# Patient Record
Sex: MALE
[De-identification: names, ages, dates, MRNs, and addresses within clinical notes are randomized per-mention and may not be internally consistent; named-entity substitution may affect disease eponyms.]

---

## 2017-01-13 ENCOUNTER — TRANSCRIPTION ENCOUNTER (OUTPATIENT)
Age: 47
End: 2017-01-13

## 2017-02-06 ENCOUNTER — OTHER (OUTPATIENT)
Age: 47
End: 2017-02-06

## 2017-05-15 ENCOUNTER — OTHER (OUTPATIENT)
Age: 47
End: 2017-05-15

## 2017-06-22 ENCOUNTER — OUTPATIENT (OUTPATIENT)
Dept: OUTPATIENT SERVICES | Facility: HOSPITAL | Age: 47
LOS: 1 days | Discharge: HOME | End: 2017-06-22

## 2017-06-22 DIAGNOSIS — D51.0 VITAMIN B12 DEFICIENCY ANEMIA DUE TO INTRINSIC FACTOR DEFICIENCY: ICD-10-CM

## 2017-06-27 ENCOUNTER — APPOINTMENT (OUTPATIENT)
Dept: HEMATOLOGY ONCOLOGY | Facility: CLINIC | Age: 47
End: 2017-06-27

## 2017-06-27 ENCOUNTER — RESULT REVIEW (OUTPATIENT)
Age: 47
End: 2017-06-27

## 2017-06-27 ENCOUNTER — OUTPATIENT (OUTPATIENT)
Dept: OUTPATIENT SERVICES | Facility: HOSPITAL | Age: 47
LOS: 1 days | End: 2017-06-27
Payer: COMMERCIAL

## 2017-06-27 ENCOUNTER — APPOINTMENT (OUTPATIENT)
Dept: MRI IMAGING | Facility: IMAGING CENTER | Age: 47
End: 2017-06-27

## 2017-06-27 VITALS
TEMPERATURE: 98 F | SYSTOLIC BLOOD PRESSURE: 125 MMHG | OXYGEN SATURATION: 100 % | HEART RATE: 58 BPM | HEIGHT: 70.08 IN | BODY MASS INDEX: 28.63 KG/M2 | DIASTOLIC BLOOD PRESSURE: 82 MMHG | WEIGHT: 199.96 LBS | RESPIRATION RATE: 16 BRPM

## 2017-06-27 DIAGNOSIS — C49.A9 GASTROINTESTINAL STROMAL TUMOR OF OTHER SITES: ICD-10-CM

## 2017-06-27 LAB
HCT VFR BLD CALC: 39.1 % — SIGNIFICANT CHANGE UP (ref 39–50)
HGB BLD-MCNC: 13.4 G/DL — SIGNIFICANT CHANGE UP (ref 13–17)
MCHC RBC-ENTMCNC: 34.3 G/DL — SIGNIFICANT CHANGE UP (ref 32–36)
MCHC RBC-ENTMCNC: 34.6 PG — HIGH (ref 27–34)
MCV RBC AUTO: 101 FL — HIGH (ref 80–100)
PLATELET # BLD AUTO: 230 K/UL — SIGNIFICANT CHANGE UP (ref 150–400)
RBC # BLD: 3.88 M/UL — LOW (ref 4.2–5.8)
RBC # FLD: 11.9 % — SIGNIFICANT CHANGE UP (ref 10.3–14.5)
WBC # BLD: 4.7 K/UL — SIGNIFICANT CHANGE UP (ref 3.8–10.5)
WBC # FLD AUTO: 4.7 K/UL — SIGNIFICANT CHANGE UP (ref 3.8–10.5)

## 2017-06-27 PROCEDURE — A9585: CPT

## 2017-06-27 PROCEDURE — 72197 MRI PELVIS W/O & W/DYE: CPT

## 2017-06-27 PROCEDURE — 74183 MRI ABD W/O CNTR FLWD CNTR: CPT

## 2017-06-28 LAB
25(OH)D3 SERPL-MCNC: 38.3 NG/ML
ALBUMIN SERPL ELPH-MCNC: 4.4 G/DL
ALP BLD-CCNC: 46 U/L
ALT SERPL-CCNC: 16 U/L
ANION GAP SERPL CALC-SCNC: 14 MMOL/L
AST SERPL-CCNC: 25 U/L
BILIRUB SERPL-MCNC: 0.4 MG/DL
BUN SERPL-MCNC: 12 MG/DL
CALCIUM SERPL-MCNC: 8.9 MG/DL
CHLORIDE SERPL-SCNC: 102 MMOL/L
CO2 SERPL-SCNC: 25 MMOL/L
CREAT SERPL-MCNC: 1.01 MG/DL
GLUCOSE SERPL-MCNC: 101 MG/DL
POTASSIUM SERPL-SCNC: 4.4 MMOL/L
PROT SERPL-MCNC: 7.5 G/DL
SODIUM SERPL-SCNC: 141 MMOL/L
TSH SERPL-ACNC: 1.97 UIU/ML

## 2018-01-19 ENCOUNTER — MEDICATION RENEWAL (OUTPATIENT)
Age: 48
End: 2018-01-19

## 2018-02-12 ENCOUNTER — FORM ENCOUNTER (OUTPATIENT)
Age: 48
End: 2018-02-12

## 2018-02-12 ENCOUNTER — OUTPATIENT (OUTPATIENT)
Dept: OUTPATIENT SERVICES | Facility: HOSPITAL | Age: 48
LOS: 1 days | Discharge: HOME | End: 2018-02-12

## 2018-02-12 DIAGNOSIS — D51.0 VITAMIN B12 DEFICIENCY ANEMIA DUE TO INTRINSIC FACTOR DEFICIENCY: ICD-10-CM

## 2018-02-13 ENCOUNTER — APPOINTMENT (OUTPATIENT)
Dept: HEMATOLOGY ONCOLOGY | Facility: CLINIC | Age: 48
End: 2018-02-13
Payer: COMMERCIAL

## 2018-02-13 ENCOUNTER — APPOINTMENT (OUTPATIENT)
Dept: MRI IMAGING | Facility: CLINIC | Age: 48
End: 2018-02-13
Payer: COMMERCIAL

## 2018-02-13 ENCOUNTER — LABORATORY RESULT (OUTPATIENT)
Age: 48
End: 2018-02-13

## 2018-02-13 ENCOUNTER — OUTPATIENT (OUTPATIENT)
Dept: OUTPATIENT SERVICES | Facility: HOSPITAL | Age: 48
LOS: 1 days | End: 2018-02-13
Payer: COMMERCIAL

## 2018-02-13 ENCOUNTER — RESULT REVIEW (OUTPATIENT)
Age: 48
End: 2018-02-13

## 2018-02-13 VITALS
OXYGEN SATURATION: 100 % | BODY MASS INDEX: 30.02 KG/M2 | TEMPERATURE: 99.3 F | HEART RATE: 75 BPM | DIASTOLIC BLOOD PRESSURE: 85 MMHG | RESPIRATION RATE: 16 BRPM | WEIGHT: 209.66 LBS | SYSTOLIC BLOOD PRESSURE: 137 MMHG

## 2018-02-13 DIAGNOSIS — Z00.8 ENCOUNTER FOR OTHER GENERAL EXAMINATION: ICD-10-CM

## 2018-02-13 DIAGNOSIS — C49.A9 GASTROINTESTINAL STROMAL TUMOR OF OTHER SITES: ICD-10-CM

## 2018-02-13 LAB
ALBUMIN SERPL ELPH-MCNC: 4 G/DL
ALP BLD-CCNC: 46 U/L
ALT SERPL-CCNC: 13 U/L
ANION GAP SERPL CALC-SCNC: 11 MMOL/L
AST SERPL-CCNC: 19 U/L
BILIRUB SERPL-MCNC: 0.4 MG/DL
BUN SERPL-MCNC: 13 MG/DL
CALCIUM SERPL-MCNC: 8.7 MG/DL
CHLORIDE SERPL-SCNC: 104 MMOL/L
CO2 SERPL-SCNC: 26 MMOL/L
CREAT SERPL-MCNC: 1.16 MG/DL
GLUCOSE SERPL-MCNC: 95 MG/DL
HCT VFR BLD CALC: 38.4 % — LOW (ref 39–50)
HGB BLD-MCNC: 13 G/DL — SIGNIFICANT CHANGE UP (ref 13–17)
MCHC RBC-ENTMCNC: 33.8 G/DL — SIGNIFICANT CHANGE UP (ref 32–36)
MCHC RBC-ENTMCNC: 34 PG — SIGNIFICANT CHANGE UP (ref 27–34)
MCV RBC AUTO: 101 FL — HIGH (ref 80–100)
PLATELET # BLD AUTO: 211 K/UL — SIGNIFICANT CHANGE UP (ref 150–400)
POTASSIUM SERPL-SCNC: 3.9 MMOL/L
PROT SERPL-MCNC: 6.4 G/DL
RBC # BLD: 3.82 M/UL — LOW (ref 4.2–5.8)
RBC # FLD: 11.9 % — SIGNIFICANT CHANGE UP (ref 10.3–14.5)
SODIUM SERPL-SCNC: 141 MMOL/L
TSH SERPL-ACNC: 2.87 UIU/ML
WBC # BLD: 4.2 K/UL — SIGNIFICANT CHANGE UP (ref 3.8–10.5)
WBC # FLD AUTO: 4.2 K/UL — SIGNIFICANT CHANGE UP (ref 3.8–10.5)

## 2018-02-13 PROCEDURE — A9585: CPT

## 2018-02-13 PROCEDURE — 99214 OFFICE O/P EST MOD 30 MIN: CPT

## 2018-02-13 PROCEDURE — 74183 MRI ABD W/O CNTR FLWD CNTR: CPT

## 2018-02-13 PROCEDURE — 74183 MRI ABD W/O CNTR FLWD CNTR: CPT | Mod: 26

## 2018-02-13 PROCEDURE — 72197 MRI PELVIS W/O & W/DYE: CPT

## 2018-02-13 PROCEDURE — 72197 MRI PELVIS W/O & W/DYE: CPT | Mod: 26

## 2018-03-29 ENCOUNTER — MEDICATION RENEWAL (OUTPATIENT)
Age: 48
End: 2018-03-29

## 2018-04-12 ENCOUNTER — MEDICATION RENEWAL (OUTPATIENT)
Age: 48
End: 2018-04-12

## 2018-04-16 ENCOUNTER — OTHER (OUTPATIENT)
Age: 48
End: 2018-04-16

## 2018-08-16 ENCOUNTER — APPOINTMENT (OUTPATIENT)
Dept: HEMATOLOGY ONCOLOGY | Facility: CLINIC | Age: 48
End: 2018-08-16

## 2018-09-20 ENCOUNTER — OUTPATIENT (OUTPATIENT)
Dept: OUTPATIENT SERVICES | Facility: HOSPITAL | Age: 48
LOS: 1 days | Discharge: HOME | End: 2018-09-20

## 2018-09-20 DIAGNOSIS — D51.0 VITAMIN B12 DEFICIENCY ANEMIA DUE TO INTRINSIC FACTOR DEFICIENCY: ICD-10-CM

## 2018-09-27 ENCOUNTER — RESULT REVIEW (OUTPATIENT)
Age: 48
End: 2018-09-27

## 2018-09-27 ENCOUNTER — APPOINTMENT (OUTPATIENT)
Dept: HEMATOLOGY ONCOLOGY | Facility: CLINIC | Age: 48
End: 2018-09-27
Payer: COMMERCIAL

## 2018-09-27 ENCOUNTER — OUTPATIENT (OUTPATIENT)
Dept: OUTPATIENT SERVICES | Facility: HOSPITAL | Age: 48
LOS: 1 days | End: 2018-09-27
Payer: COMMERCIAL

## 2018-09-27 ENCOUNTER — APPOINTMENT (OUTPATIENT)
Dept: MRI IMAGING | Facility: CLINIC | Age: 48
End: 2018-09-27
Payer: COMMERCIAL

## 2018-09-27 VITALS
DIASTOLIC BLOOD PRESSURE: 80 MMHG | TEMPERATURE: 98.1 F | WEIGHT: 207.23 LBS | BODY MASS INDEX: 29.67 KG/M2 | HEART RATE: 70 BPM | OXYGEN SATURATION: 100 % | RESPIRATION RATE: 16 BRPM | SYSTOLIC BLOOD PRESSURE: 130 MMHG

## 2018-09-27 DIAGNOSIS — C49.A9 GASTROINTESTINAL STROMAL TUMOR OF OTHER SITES: ICD-10-CM

## 2018-09-27 DIAGNOSIS — R79.89 OTHER SPECIFIED ABNORMAL FINDINGS OF BLOOD CHEMISTRY: ICD-10-CM

## 2018-09-27 LAB
BASOPHILS # BLD AUTO: 0 K/UL — SIGNIFICANT CHANGE UP (ref 0–0.2)
BASOPHILS NFR BLD AUTO: 0.9 % — SIGNIFICANT CHANGE UP (ref 0–2)
EOSINOPHIL # BLD AUTO: 0.1 K/UL — SIGNIFICANT CHANGE UP (ref 0–0.5)
EOSINOPHIL NFR BLD AUTO: 2.9 % — SIGNIFICANT CHANGE UP (ref 0–6)
HCT VFR BLD CALC: 38.5 % — LOW (ref 39–50)
HGB BLD-MCNC: 13.1 G/DL — SIGNIFICANT CHANGE UP (ref 13–17)
LYMPHOCYTES # BLD AUTO: 1.3 K/UL — SIGNIFICANT CHANGE UP (ref 1–3.3)
LYMPHOCYTES # BLD AUTO: 30.5 % — SIGNIFICANT CHANGE UP (ref 13–44)
MCHC RBC-ENTMCNC: 33.4 PG — SIGNIFICANT CHANGE UP (ref 27–34)
MCHC RBC-ENTMCNC: 34 G/DL — SIGNIFICANT CHANGE UP (ref 32–36)
MCV RBC AUTO: 98.4 FL — SIGNIFICANT CHANGE UP (ref 80–100)
MONOCYTES # BLD AUTO: 0.5 K/UL — SIGNIFICANT CHANGE UP (ref 0–0.9)
MONOCYTES NFR BLD AUTO: 12.2 % — SIGNIFICANT CHANGE UP (ref 2–14)
NEUTROPHILS # BLD AUTO: 2.3 K/UL — SIGNIFICANT CHANGE UP (ref 1.8–7.4)
NEUTROPHILS NFR BLD AUTO: 53.5 % — SIGNIFICANT CHANGE UP (ref 43–77)
PLATELET # BLD AUTO: 227 K/UL — SIGNIFICANT CHANGE UP (ref 150–400)
RBC # BLD: 3.91 M/UL — LOW (ref 4.2–5.8)
RBC # FLD: 12 % — SIGNIFICANT CHANGE UP (ref 10.3–14.5)
WBC # BLD: 4.3 K/UL — SIGNIFICANT CHANGE UP (ref 3.8–10.5)
WBC # FLD AUTO: 4.3 K/UL — SIGNIFICANT CHANGE UP (ref 3.8–10.5)

## 2018-09-27 PROCEDURE — 74183 MRI ABD W/O CNTR FLWD CNTR: CPT | Mod: 26

## 2018-09-27 PROCEDURE — 72197 MRI PELVIS W/O & W/DYE: CPT | Mod: 26

## 2018-09-27 PROCEDURE — A9585: CPT

## 2018-09-27 PROCEDURE — 74183 MRI ABD W/O CNTR FLWD CNTR: CPT

## 2018-09-27 PROCEDURE — 72197 MRI PELVIS W/O & W/DYE: CPT

## 2018-09-27 PROCEDURE — 99213 OFFICE O/P EST LOW 20 MIN: CPT

## 2018-09-28 LAB
ALBUMIN SERPL ELPH-MCNC: 4.3 G/DL
ALP BLD-CCNC: 55 U/L
ALT SERPL-CCNC: 19 U/L
ANION GAP SERPL CALC-SCNC: 13 MMOL/L
AST SERPL-CCNC: 29 U/L
BILIRUB SERPL-MCNC: 0.4 MG/DL
BUN SERPL-MCNC: 14 MG/DL
CALCIUM SERPL-MCNC: 8.9 MG/DL
CHLORIDE SERPL-SCNC: 101 MMOL/L
CO2 SERPL-SCNC: 27 MMOL/L
CREAT SERPL-MCNC: 1.34 MG/DL
GLUCOSE SERPL-MCNC: 93 MG/DL
POTASSIUM SERPL-SCNC: 4.3 MMOL/L
PROT SERPL-MCNC: 6.8 G/DL
SODIUM SERPL-SCNC: 141 MMOL/L
TSH SERPL-ACNC: 1.54 UIU/ML

## 2018-10-02 PROBLEM — R79.89 ELEVATED SERUM CREATININE: Status: ACTIVE | Noted: 2018-10-02

## 2018-10-10 ENCOUNTER — TRANSCRIPTION ENCOUNTER (OUTPATIENT)
Age: 48
End: 2018-10-10

## 2018-10-25 ENCOUNTER — MEDICATION RENEWAL (OUTPATIENT)
Age: 48
End: 2018-10-25

## 2019-04-12 ENCOUNTER — OUTPATIENT (OUTPATIENT)
Dept: OUTPATIENT SERVICES | Facility: HOSPITAL | Age: 49
LOS: 1 days | Discharge: HOME | End: 2019-04-12

## 2019-04-12 DIAGNOSIS — D51.0 VITAMIN B12 DEFICIENCY ANEMIA DUE TO INTRINSIC FACTOR DEFICIENCY: ICD-10-CM

## 2019-04-18 ENCOUNTER — APPOINTMENT (OUTPATIENT)
Dept: HEMATOLOGY ONCOLOGY | Facility: CLINIC | Age: 49
End: 2019-04-18
Payer: COMMERCIAL

## 2019-04-18 VITALS
DIASTOLIC BLOOD PRESSURE: 62 MMHG | HEART RATE: 69 BPM | OXYGEN SATURATION: 99 % | WEIGHT: 202.83 LBS | BODY MASS INDEX: 29.04 KG/M2 | RESPIRATION RATE: 16 BRPM | TEMPERATURE: 98 F | SYSTOLIC BLOOD PRESSURE: 107 MMHG

## 2019-04-18 PROCEDURE — 99213 OFFICE O/P EST LOW 20 MIN: CPT

## 2019-04-18 NOTE — RESULTS/DATA
[FreeTextEntry1] : MRI Abd Pelvis - 09/28/2018: My review, I saw these scans myself - disease no worse in liver. No extrahepatic disease either. Final review PENDING by a real radiologist\par \par MRI abd/pelvis : 4/12/19 : report reviewed, no change.

## 2019-04-18 NOTE — END OF VISIT
[FreeTextEntry3] : KARI Morataya, not a fellow, is with whom I am attestisizing. In fact, I agree with the Hx, interval Hx, ROS, medication list, allergies, exam, lab review and plan as outlined. [] : Fellow

## 2019-04-18 NOTE — CONSULT LETTER
[Dear  ___] : Dear  [unfilled], [Please see my note below.] : Please see my note below. [Consult Closing:] : Thank you very much for allowing me to participate in the care of this patient.  If you have any questions, please do not hesitate to contact me. [Sincerely,] : Sincerely, [DrMilana  ___] : Dr. QUINONEZ [FreeTextEntry2] : Freddy Mena III, MD     [address]    [address] [address]    [phone] [FreeTextEntry3] : Aníbal Lang MD PhD FACP\par \par [facility]\par [facility] [FreeTextEntry1] : Adán is doing very well and hopefully that will continue to be the case. We will see him again in 4-6 months with new imaging.

## 2019-04-18 NOTE — REVIEW OF SYSTEMS
[Patient Intake Form Reviewed] : Patient intake form was reviewed [Negative] : Heme/Lymph [FreeTextEntry3] : except minimal periorbital edema

## 2019-04-18 NOTE — ASSESSMENT
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 If worse at primary site he could under surgery or RT. In the meantime he has an excellent result from imatinib for his liver metastatic disease. \par \par Suggest\par \par MRI : Doing well, no new imaging planned for at least 6 mo if not more. \par Continue imatinib 400 daily except for any intercurrent illnesses. 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 \par 2nd line Rx trials or sunitinib \par \par Pt seen with Dr. Aníbal Lang MD PhD\par \par Mounika Morataya, ANP-BC\par

## 2019-04-18 NOTE — HISTORY OF PRESENT ILLNESS
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 He has a sustained partial response to imatinib. He is seen for advice, opinion on further management. KIT mutation positive in exon 11\par \par He was in his USOH until:\par \par 06/24/2012: Presented to [facility] ED with lightheadedness, tachycardia, SOB with 3 episodes of syncope, 2 after emesis, 1 after urination. He had fever to 100.7 x 3 days as well. He had been taking ibuprofen for 2 weeks for chronic neck pain. FOBT was positive, and he was admitted. H/H = 7 and 22. \par \par 06/26/2012: EGD/EUS showed 4 cm mass at 4th layer of wall of GEJ. Three lesions were noted in the liver 2-3 cm in size as well. Bx showed GIST. CT showed 6.9 cm lesion in right lobe of liver, 4.4 cm lesion in caudate, 5.1 cm  lesion in LL liver, and 4 cm ulcerated lobulated gastric mass at the fundus/cardia.\par \par 07/12/2012: Started imatinib 400 mg oral daily with occasional issues with bone pain, jonn. in left leg. \par \par 09/2012: Doing well in follow up with evidence of VA already. \par \par 2013: Some issues with psoriatic rash and acneiform rash after sun exposure. \par \par [id number]: Treatment with imatinib tolerated well without dose reduction. 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 \par \par 6/27/17: Here for a follow up. KIT mutation was positive at exon 11. MRI today, final report pending however appears stable overall. Continues on imatinib. Periorbital edema is minimal.\par \par 2/13/18: Follow up on imatinib. MRI this AM prior to the visit. Energy is good, continues on imatinib 400 mg daily for the last 5 years. Just returned from [place] from vacation.\par \par 9/27/18 : Here for a follow up after 7 months. Feels well and continues on imatinib.MRI ABD/pelvis obtained today, with no worsening. \par \par 4/18/19 : Rafal is here for a follow up with blood work after 6 months. MRI at [place] showing no change in the liver lesion. NO change in ROS aside from occasional cramps and dry skin. \par \par He returns for advice, opinion on further management.  [de-identified] : See above

## 2019-05-30 ENCOUNTER — MEDICATION RENEWAL (OUTPATIENT)
Age: 49
End: 2019-05-30

## 2019-05-31 ENCOUNTER — TRANSCRIPTION ENCOUNTER (OUTPATIENT)
Age: 49
End: 2019-05-31

## 2019-06-06 ENCOUNTER — TRANSCRIPTION ENCOUNTER (OUTPATIENT)
Age: 49
End: 2019-06-06

## 2019-10-17 ENCOUNTER — OUTPATIENT (OUTPATIENT)
Dept: OUTPATIENT SERVICES | Facility: HOSPITAL | Age: 49
LOS: 1 days | Discharge: HOME | End: 2019-10-17

## 2019-10-17 DIAGNOSIS — D51.0 VITAMIN B12 DEFICIENCY ANEMIA DUE TO INTRINSIC FACTOR DEFICIENCY: ICD-10-CM

## 2019-10-23 ENCOUNTER — APPOINTMENT (OUTPATIENT)
Dept: HEMATOLOGY ONCOLOGY | Facility: CLINIC | Age: 49
End: 2019-10-23
Payer: COMMERCIAL

## 2019-10-23 VITALS
OXYGEN SATURATION: 97 % | HEART RATE: 65 BPM | WEIGHT: 199.29 LBS | TEMPERATURE: 98.4 F | DIASTOLIC BLOOD PRESSURE: 70 MMHG | RESPIRATION RATE: 17 BRPM | SYSTOLIC BLOOD PRESSURE: 109 MMHG | BODY MASS INDEX: 28.53 KG/M2

## 2019-10-23 DIAGNOSIS — C49.A9 GASTROINTESTINAL STROMAL TUMOR OF OTHER SITES: ICD-10-CM

## 2019-10-23 DIAGNOSIS — C78.7 SECONDARY MALIGNANT NEOPLASM OF LIVER AND INTRAHEPATIC BILE DUCT: ICD-10-CM

## 2019-10-23 DIAGNOSIS — C49.9 SECONDARY MALIGNANT NEOPLASM OF LIVER AND INTRAHEPATIC BILE DUCT: ICD-10-CM

## 2019-10-23 PROCEDURE — 99213 OFFICE O/P EST LOW 20 MIN: CPT

## 2019-10-23 NOTE — END OF VISIT
[] : Fellow [FreeTextEntry3] : Patient seen and examined with KARI Morataya (she is not a fellow as the boilerplate note template indicates). I agree with the hx, ROS, exam, lab review and plan as outlined. [>50% of Time Spent on Counseling and Coordination of Care for  ___] : Greater than 50% of the encounter time was spent on counseling and coordination of care for [unfilled] [Time Spent: ___ minutes] : I have spent [unfilled] minutes of face to face time with the patient

## 2019-10-23 NOTE — REVIEW OF SYSTEMS
[Patient Intake Form Reviewed] : Patient intake form was reviewed [Negative] : Allergic/Immunologic [FreeTextEntry3] : except minimal periorbital edema

## 2019-10-23 NOTE — RESULTS/DATA
[FreeTextEntry1] : MRI abd/pelvis : 4/12/19 : report reviewed, no change. \par \par MR 9/19/2019 - we reviewed the images ourselves. My interpretation no obvious disease worsening

## 2019-10-23 NOTE — CONSULT LETTER
[Please see my note below.] : Please see my note below. [Dear  ___] : Dear  [unfilled], [Sincerely,] : Sincerely, [Consult Closing:] : Thank you very much for allowing me to participate in the care of this patient.  If you have any questions, please do not hesitate to contact me. [DrMilana  ___] : Dr. QUINONEZ [FreeTextEntry2] : Freddy Mena III, MD     [address]    [address] [address]    [phone] [FreeTextEntry1] : Adán is doing very well and hopefully that will continue to be the case. We will see him again in 6 months with new imaging, likely at [place], to where I will move in the next couple of months.   [FreeTextEntry3] : ERICA Lang\[facility]

## 2019-10-23 NOTE — PHYSICAL EXAM
[Fully active, able to carry on all pre-disease performance without restriction] : Status 0 - Fully active, able to carry on all pre-disease performance without restriction [Normal] : affect appropriate [de-identified] : Incisions healed

## 2019-10-23 NOTE — HISTORY OF PRESENT ILLNESS
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 He has a sustained partial response to imatinib. He is seen for advice, opinion on further management. KIT mutation positive in exon 11\par \par He was in his USOH until:\par \par 06/24/2012: Presented to [facility] ED with lightheadedness, tachycardia, SOB with 3 episodes of syncope, 2 after emesis, 1 after urination. He had fever to 100.7 x 3 days as well. He had been taking ibuprofen for 2 weeks for chronic neck pain. FOBT was positive, and he was admitted. H/H = 7 and 22. \par \par 06/26/2012: EGD/EUS showed 4 cm mass at 4th layer of wall of GEJ. Three lesions were noted in the liver 2-3 cm in size as well. Bx showed GIST. CT showed 6.9 cm lesion in right lobe of liver, 4.4 cm lesion in caudate, 5.1 cm  lesion in LL liver, and 4 cm ulcerated lobulated gastric mass at the fundus/cardia.\par \par 07/12/2012: Started imatinib 400 mg oral daily with occasional issues with bone pain, jonn. in left leg. \par \par 09/2012: Doing well in follow up with evidence of VT already. \par \par 2013: Some issues with psoriatic rash and acneiform rash after sun exposure. \par \par [id number]: Treatment with imatinib tolerated well without dose reduction. 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 \par \par 6/27/17: Here for a follow up. KIT mutation was positive at exon 11. MRI today, final report pending however appears stable overall. Continues on imatinib. Periorbital edema is minimal.\par \par 2/13/18: Follow up on imatinib. MRI this AM prior to the visit. Energy is good, continues on imatinib 400 mg daily for the last 5 years. Just returned from [place] from vacation.\par \par 9/27/18 : Here for a follow up after 7 months. Feels well and continues on imatinib.MRI ABD/pelvis obtained today, with no worsening. \par \par 4/18/19 : Rafal is here for a follow up with blood work after 6 months. MRI at [place] showing no change in the liver lesion. NO change in ROS aside from occasional cramps and dry skin. \par \par 10/18/19 : MRI ABD/PELVIS : No evidence of met disease in the abd, scattered haptic lesions are without significant change and without suspicious features. Labs without concerning results. \par \par 10/21/19 : Here for a follow up with new imaging. He has been on imatinib since 2012. He feels well overall and no changes in his health. \par \par He returns for advice, opinion on further management.  [de-identified] : See above

## 2019-10-23 NOTE — ASSESSMENT
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 If worse at primary site he could under surgery or RT. In the meantime he has an excellent result from imatinib for his liver metastatic disease. \par \par Suggest\par \par MRI : Doing well, no new imaging planned for at least 6 mo if not more. \par MR is preferable to CT scan with respect to any issues with CrCL, addressed at his last primary care visit. \par Continue imatinib 400 daily except for any intercurrent illnesses. 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 \par 2nd line Rx trials or sunitinib \par \par Pt seen with Dr. Aníbal Lang MD PhD\par \par MARK Fortune-BC\par